# Patient Record
Sex: MALE | Race: WHITE
[De-identification: names, ages, dates, MRNs, and addresses within clinical notes are randomized per-mention and may not be internally consistent; named-entity substitution may affect disease eponyms.]

---

## 2021-01-02 ENCOUNTER — HOSPITAL ENCOUNTER (EMERGENCY)
Dept: HOSPITAL 95 - ER | Age: 66
Discharge: HOME | End: 2021-01-02
Payer: MEDICARE

## 2021-01-02 VITALS — BODY MASS INDEX: 28.44 KG/M2 | WEIGHT: 209.99 LBS | HEIGHT: 72 IN

## 2021-01-02 DIAGNOSIS — R40.4: Primary | ICD-10-CM

## 2021-01-02 LAB
ALBUMIN SERPL BCP-MCNC: 3.4 G/DL (ref 3.4–5)
ALBUMIN/GLOB SERPL: 1.1 {RATIO} (ref 0.8–1.8)
ALT SERPL W P-5'-P-CCNC: 30 U/L (ref 12–78)
ANION GAP SERPL CALCULATED.4IONS-SCNC: 8 MMOL/L (ref 6–16)
AST SERPL W P-5'-P-CCNC: 15 U/L (ref 12–37)
BASOPHILS # BLD AUTO: 0.02 K/MM3 (ref 0–0.23)
BASOPHILS NFR BLD AUTO: 0 % (ref 0–2)
BILIRUB SERPL-MCNC: 0.5 MG/DL (ref 0.1–1)
BUN SERPL-MCNC: 20 MG/DL (ref 8–24)
CALCIUM SERPL-MCNC: 8.6 MG/DL (ref 8.5–10.1)
CHLORIDE SERPL-SCNC: 110 MMOL/L (ref 98–108)
CK SERPL-CCNC: 105 U/L (ref 39–308)
CO2 SERPL-SCNC: 25 MMOL/L (ref 21–32)
CREAT SERPL-MCNC: 0.99 MG/DL (ref 0.6–1.2)
DEPRECATED RDW RBC AUTO: 41.8 FL (ref 35.1–46.3)
EOSINOPHIL # BLD AUTO: 0.11 K/MM3 (ref 0–0.68)
EOSINOPHIL NFR BLD AUTO: 2 % (ref 0–6)
ERYTHROCYTE [DISTWIDTH] IN BLOOD BY AUTOMATED COUNT: 12.6 % (ref 11.7–14.2)
GLOBULIN SER CALC-MCNC: 3.1 G/DL (ref 2.2–4)
GLUCOSE SERPL-MCNC: 114 MG/DL (ref 70–99)
HCT VFR BLD AUTO: 47.7 % (ref 37–53)
HGB BLD-MCNC: 15.7 G/DL (ref 13.5–17.5)
IMM GRANULOCYTES # BLD AUTO: 0.02 K/MM3 (ref 0–0.1)
IMM GRANULOCYTES NFR BLD AUTO: 0 % (ref 0–1)
LYMPHOCYTES # BLD AUTO: 1.1 K/MM3 (ref 0.84–5.2)
LYMPHOCYTES NFR BLD AUTO: 22 % (ref 21–46)
MCHC RBC AUTO-ENTMCNC: 32.9 G/DL (ref 31.5–36.5)
MCV RBC AUTO: 90 FL (ref 80–100)
MONOCYTES # BLD AUTO: 0.48 K/MM3 (ref 0.16–1.47)
MONOCYTES NFR BLD AUTO: 10 % (ref 4–13)
NEUTROPHILS # BLD AUTO: 3.32 K/MM3 (ref 1.96–9.15)
NEUTROPHILS NFR BLD AUTO: 66 % (ref 41–73)
NRBC # BLD AUTO: 0 K/MM3 (ref 0–0.02)
NRBC BLD AUTO-RTO: 0 /100 WBC (ref 0–0.2)
PLATELET # BLD AUTO: 188 K/MM3 (ref 150–400)
POTASSIUM SERPL-SCNC: 4.2 MMOL/L (ref 3.5–5.5)
PROT SERPL-MCNC: 6.5 G/DL (ref 6.4–8.2)
PROTHROMBIN TIME: 10.7 SEC (ref 9.7–11.5)
SODIUM SERPL-SCNC: 143 MMOL/L (ref 136–145)

## 2021-01-10 ENCOUNTER — HOSPITAL ENCOUNTER (INPATIENT)
Dept: HOSPITAL 95 - ER | Age: 66
LOS: 2 days | Discharge: HOME | DRG: 101 | End: 2021-01-12
Attending: HOSPITALIST | Admitting: HOSPITALIST
Payer: MEDICARE

## 2021-01-10 VITALS — BODY MASS INDEX: 29.16 KG/M2 | WEIGHT: 220 LBS | HEIGHT: 72.99 IN

## 2021-01-10 DIAGNOSIS — G93.40: ICD-10-CM

## 2021-01-10 DIAGNOSIS — G40.209: Primary | ICD-10-CM

## 2021-01-10 LAB
ALBUMIN SERPL BCP-MCNC: 3.5 G/DL (ref 3.4–5)
ALBUMIN SERPL BCP-MCNC: 3.9 G/DL (ref 3.4–5)
ALBUMIN/GLOB SERPL: 1.1 {RATIO} (ref 0.8–1.8)
ALBUMIN/GLOB SERPL: 1.1 {RATIO} (ref 0.8–1.8)
ALT SERPL W P-5'-P-CCNC: 27 U/L (ref 12–78)
ALT SERPL W P-5'-P-CCNC: 32 U/L (ref 12–78)
ANION GAP SERPL CALCULATED.4IONS-SCNC: 7 MMOL/L (ref 6–16)
ANION GAP SERPL CALCULATED.4IONS-SCNC: 9 MMOL/L (ref 6–16)
AST SERPL W P-5'-P-CCNC: 15 U/L (ref 12–37)
AST SERPL W P-5'-P-CCNC: 18 U/L (ref 12–37)
BASOPHILS # BLD AUTO: 0.03 K/MM3 (ref 0–0.23)
BASOPHILS # BLD AUTO: 0.05 K/MM3 (ref 0–0.23)
BASOPHILS NFR BLD AUTO: 0 % (ref 0–2)
BASOPHILS NFR BLD AUTO: 1 % (ref 0–2)
BILIRUB SERPL-MCNC: 0.3 MG/DL (ref 0.1–1)
BILIRUB SERPL-MCNC: 0.4 MG/DL (ref 0.1–1)
BUN SERPL-MCNC: 21 MG/DL (ref 8–24)
BUN SERPL-MCNC: 22 MG/DL (ref 8–24)
CALCIUM SERPL-MCNC: 8.9 MG/DL (ref 8.5–10.1)
CALCIUM SERPL-MCNC: 9 MG/DL (ref 8.5–10.1)
CHLORIDE SERPL-SCNC: 107 MMOL/L (ref 98–108)
CHLORIDE SERPL-SCNC: 108 MMOL/L (ref 98–108)
CK SERPL-CCNC: 112 U/L (ref 39–308)
CO2 SERPL-SCNC: 26 MMOL/L (ref 21–32)
CO2 SERPL-SCNC: 26 MMOL/L (ref 21–32)
CREAT SERPL-MCNC: 0.88 MG/DL (ref 0.6–1.2)
CREAT SERPL-MCNC: 1.07 MG/DL (ref 0.6–1.2)
DEPRECATED RDW RBC AUTO: 41.5 FL (ref 35.1–46.3)
DEPRECATED RDW RBC AUTO: 41.7 FL (ref 35.1–46.3)
EOSINOPHIL # BLD AUTO: 0.04 K/MM3 (ref 0–0.68)
EOSINOPHIL # BLD AUTO: 0.11 K/MM3 (ref 0–0.68)
EOSINOPHIL NFR BLD AUTO: 1 % (ref 0–6)
EOSINOPHIL NFR BLD AUTO: 2 % (ref 0–6)
ERYTHROCYTE [DISTWIDTH] IN BLOOD BY AUTOMATED COUNT: 12.6 % (ref 11.7–14.2)
ERYTHROCYTE [DISTWIDTH] IN BLOOD BY AUTOMATED COUNT: 12.6 % (ref 11.7–14.2)
ETHANOL SERPL-MCNC: <3 MG/DL
GLOBULIN SER CALC-MCNC: 3.1 G/DL (ref 2.2–4)
GLOBULIN SER CALC-MCNC: 3.4 G/DL (ref 2.2–4)
GLUCOSE SERPL-MCNC: 107 MG/DL (ref 70–99)
GLUCOSE SERPL-MCNC: 141 MG/DL (ref 70–99)
HCT VFR BLD AUTO: 45.1 % (ref 37–53)
HCT VFR BLD AUTO: 47.8 % (ref 37–53)
HGB BLD-MCNC: 14.9 G/DL (ref 13.5–17.5)
HGB BLD-MCNC: 15.6 G/DL (ref 13.5–17.5)
IMM GRANULOCYTES # BLD AUTO: 0.02 K/MM3 (ref 0–0.1)
IMM GRANULOCYTES # BLD AUTO: 0.02 K/MM3 (ref 0–0.1)
IMM GRANULOCYTES NFR BLD AUTO: 0 % (ref 0–1)
IMM GRANULOCYTES NFR BLD AUTO: 0 % (ref 0–1)
LYMPHOCYTES # BLD AUTO: 1.04 K/MM3 (ref 0.84–5.2)
LYMPHOCYTES # BLD AUTO: 2.02 K/MM3 (ref 0.84–5.2)
LYMPHOCYTES NFR BLD AUTO: 13 % (ref 21–46)
LYMPHOCYTES NFR BLD AUTO: 29 % (ref 21–46)
MCHC RBC AUTO-ENTMCNC: 32.6 G/DL (ref 31.5–36.5)
MCHC RBC AUTO-ENTMCNC: 33 G/DL (ref 31.5–36.5)
MCV RBC AUTO: 90 FL (ref 80–100)
MCV RBC AUTO: 91 FL (ref 80–100)
MONOCYTES # BLD AUTO: 0.68 K/MM3 (ref 0.16–1.47)
MONOCYTES # BLD AUTO: 0.77 K/MM3 (ref 0.16–1.47)
MONOCYTES NFR BLD AUTO: 10 % (ref 4–13)
MONOCYTES NFR BLD AUTO: 10 % (ref 4–13)
NEUTROPHILS # BLD AUTO: 4.18 K/MM3 (ref 1.96–9.15)
NEUTROPHILS # BLD AUTO: 6 K/MM3 (ref 1.96–9.15)
NEUTROPHILS NFR BLD AUTO: 59 % (ref 41–73)
NEUTROPHILS NFR BLD AUTO: 76 % (ref 41–73)
NRBC # BLD AUTO: 0 K/MM3 (ref 0–0.02)
NRBC # BLD AUTO: 0 K/MM3 (ref 0–0.02)
NRBC BLD AUTO-RTO: 0 /100 WBC (ref 0–0.2)
NRBC BLD AUTO-RTO: 0 /100 WBC (ref 0–0.2)
PLATELET # BLD AUTO: 214 K/MM3 (ref 150–400)
PLATELET # BLD AUTO: 228 K/MM3 (ref 150–400)
POTASSIUM SERPL-SCNC: 3.9 MMOL/L (ref 3.5–5.5)
POTASSIUM SERPL-SCNC: 4.1 MMOL/L (ref 3.5–5.5)
PROT SERPL-MCNC: 6.6 G/DL (ref 6.4–8.2)
PROT SERPL-MCNC: 7.3 G/DL (ref 6.4–8.2)
PROT UR STRIP-MCNC: (no result) MG/DL
PROTHROMBIN TIME: 10.7 SEC (ref 9.7–11.5)
RBC #/AREA URNS HPF: (no result) /HPF (ref 0–2)
SODIUM SERPL-SCNC: 141 MMOL/L (ref 136–145)
SODIUM SERPL-SCNC: 142 MMOL/L (ref 136–145)
SP GR SPEC: 1 (ref 1–1.02)
UROBILINOGEN UR STRIP-MCNC: (no result) MG/DL
WBC #/AREA URNS HPF: (no result) /HPF (ref 0–5)

## 2021-01-10 PROCEDURE — A9270 NON-COVERED ITEM OR SERVICE: HCPCS

## 2021-01-10 PROCEDURE — G0378 HOSPITAL OBSERVATION PER HR: HCPCS

## 2021-01-10 PROCEDURE — G0480 DRUG TEST DEF 1-7 CLASSES: HCPCS

## 2021-01-10 NOTE — NUR
SHIFT SUMMARY
RECIEVED REPORT FROM EUNICE FRANCO, ED @ 3946. ARRIVED TO MEDICAL UNIT VIA
STRETCHER @ 5430. NO NEED FOR TRANSFER ASSISTANCE. A/O, ABLE TO MAKE NEEDS
KNOWN. COOPERATIVE WITH CARE. ANSWERS QUESTIONS APPROPRIATELY. STATES STILL
UNABLE TO RECALL HOW HE GOT TO THE HOSPITAL AND DOES NOT KNOW WHICH
MEDICATIONS HE TAKES AT HOME. RECIEVED IV FLUIDS WITHOUT COMPLICATIONS. STEADY
TRANSFER TO BATHROOM. TELE RUNNING NSR IN 80's. VSS/AFEBRILE. AWAITING MRI. NO
ACUTE NEEDS @ THIS TIME. BED REMAINS IN LOWEST POSITION. CALL LIGHT WITHIN
REACH. REPORT GIVEN TO ONCSORIN FRANCO.

## 2021-01-10 NOTE — NUR
SHIFT SUMMARY-
PT ALERT AND ORIENTED. SPOUSE CAME IN TODAY AND BROUGHT THE PT HOME MEDICATION
BOTTLES IN. UPDATED PT MED REC IN THE COMPUTER AND NOTIFIED THE DR. PT HAD A
C/O PAIN IN HIS GROIN THIS AFTERNOON, STATED IT IS ONLY WHEN HE IS TRYING TO
GET UP OUT OF BED. MEDICATED WITH ALEVE PT STATED IT IS TOLLERABLE NOW. PT
CURRENTLY IN THE BED SLEEPING WITH THE CALL LIGHT IN REACH, IV IS SL AT THIS
TIME. NO S&S OF DISTRESS. PLAN IS FOR PT TO HAVE EEG TOMORROW. PT SPOUSE IS
AWARE OF THE PLAN AT THIS TIME NUMBER IS ON THE BOARD IN THE ROOM.
 
SIDE NOTE- THE PT SLEEPS EXTREMELY HARD, STAFF CAN CALL HIS NAME LOUDLY AND
RUB HIM AND PT WILL NOT WAKE. STAFF HAVE HAD TO SHAKE HIM AWAKE T/O THE SHIFT.
ONCE HE IS AWAKE THE PT IS AWAKE AND RESPONSIVE HE IS JUST A HEAVY SLEEPER PER
HIS WIFE AS WELL.

## 2021-01-11 LAB
ANION GAP SERPL CALCULATED.4IONS-SCNC: 5 MMOL/L (ref 6–16)
BUN SERPL-MCNC: 18 MG/DL (ref 8–24)
CALCIUM SERPL-MCNC: 8.6 MG/DL (ref 8.5–10.1)
CHLORIDE SERPL-SCNC: 110 MMOL/L (ref 98–108)
CO2 SERPL-SCNC: 30 MMOL/L (ref 21–32)
CREAT SERPL-MCNC: 0.88 MG/DL (ref 0.6–1.2)
DEPRECATED RDW RBC AUTO: 42 FL (ref 35.1–46.3)
ERYTHROCYTE [DISTWIDTH] IN BLOOD BY AUTOMATED COUNT: 12.6 % (ref 11.7–14.2)
GLUCOSE SERPL-MCNC: 96 MG/DL (ref 70–99)
HCT VFR BLD AUTO: 43.7 % (ref 37–53)
HGB BLD-MCNC: 14.4 G/DL (ref 13.5–17.5)
MCHC RBC AUTO-ENTMCNC: 33 G/DL (ref 31.5–36.5)
MCV RBC AUTO: 91 FL (ref 80–100)
NRBC # BLD AUTO: 0 K/MM3 (ref 0–0.02)
NRBC BLD AUTO-RTO: 0 /100 WBC (ref 0–0.2)
PLATELET # BLD AUTO: 198 K/MM3 (ref 150–400)
POTASSIUM SERPL-SCNC: 4 MMOL/L (ref 3.5–5.5)
SODIUM SERPL-SCNC: 145 MMOL/L (ref 136–145)

## 2021-01-11 NOTE — NUR
Shift Summary
A/Ox4, pleasant and cooperative. Up independently in room, eager to go home.
EEG completed this shift, results pending. Wife at bedside for majority of the
day. Had a shower. Appetite is good. No seizure-like activities noted, no
acute changes since start of shift. Denies pain, nausea, vomiting, diarrhea,
shortness of breath. Will report to oncoming RN.

## 2021-01-11 NOTE — NUR
Upon receiving an admit referral for advance directive (AD) education, I visit
patient. Patient's spouse, Nathen is bedside. We discuss patient's medical issues
and the plan of care going forward. 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
career in the lumber industry. Nathen tells me that she has filled out an AD on
herself understands about it and just wanted a form for the patient. I leave a
form with them answer a few questions and then leave them to continue their
visit.

## 2021-01-11 NOTE — NUR
NIGHT SHIFT SUMMARY
NO NEW S/S THIS SHIFT. PT HAS SLEPT FOR MOST OF THE SHIFT, PT IS A VERY HEAVY
SLEEPER. PT IS INDEPENDENT IN THE ROOM BUT HAS REMAINED IN BED MOST OF THE
SHIFT. PT DENIED ANY PAIN OR NAUSEA THIS SHIFT. WCTM.

## 2021-01-12 NOTE — NUR
Discharge Summary
A/Ox4, discharged to home. Reviewed discharge paperwork with patient and wife
at bedside. No questions at this time. Meds faxed to preferred pharmacy. Face
sheet, H&P, Progress Notes, and EEG results faxed to [facility] for
Dr. Carcamo to review. Office will contact PCP and/or patient to either
schedule appointment or for referral to another neurologist pending note
review. Escorted by CNA via w/c. Personal belongings sent home.

## 2021-11-27 ENCOUNTER — HOSPITAL ENCOUNTER (EMERGENCY)
Dept: HOSPITAL 95 - ER | Age: 66
LOS: 1 days | Discharge: HOME | End: 2021-11-28
Payer: MEDICARE

## 2021-11-27 VITALS — BODY MASS INDEX: 27.83 KG/M2 | WEIGHT: 209.99 LBS | HEIGHT: 73 IN

## 2021-11-27 DIAGNOSIS — G40.909: Primary | ICD-10-CM

## 2021-11-27 LAB
CA-I BLD-SCNC: 1.28 MMOL/L (ref 1.1–1.46)
CHLORIDE BLD-SCNC: 105 MMOL/L (ref 98–108)
CO2 BLD-SCNC: 27 MMOL/L (ref 21–32)
CREAT BLD-MCNC: 1.3 MG/DL (ref 0.8–1.3)
GLUCOSE BLDC GLUCOMTR-MCNC: 129 MG/DL (ref 70–99)
HCT VFR BLD CALC: 41 % (ref 41–53)
HGB BLD CALC-MCNC: 13.9 G/DL (ref 13.5–17.5)
POTASSIUM BLD-SCNC: 4 MMOL/L (ref 3.5–5.5)
SODIUM BLD-SCNC: 141 MMOL/L (ref 135–148)

## 2021-11-27 PROCEDURE — A9270 NON-COVERED ITEM OR SERVICE: HCPCS

## 2023-03-12 ENCOUNTER — HOSPITAL ENCOUNTER (OUTPATIENT)
Dept: HOSPITAL 95 - ER | Age: 68
Setting detail: OBSERVATION
LOS: 2 days | Discharge: HOME | End: 2023-03-14
Attending: HOSPITALIST | Admitting: HOSPITALIST
Payer: MEDICARE

## 2023-03-12 VITALS — HEIGHT: 73 IN | BODY MASS INDEX: 29.16 KG/M2 | WEIGHT: 220 LBS

## 2023-03-12 DIAGNOSIS — I10: ICD-10-CM

## 2023-03-12 DIAGNOSIS — G40.909: Primary | ICD-10-CM

## 2023-03-12 LAB
ALBUMIN SERPL BCP-MCNC: 3.9 G/DL (ref 3.4–5)
ALBUMIN/GLOB SERPL: 1.2 {RATIO} (ref 0.8–1.8)
ALT SERPL W P-5'-P-CCNC: 34 U/L (ref 12–78)
ANION GAP SERPL CALCULATED.4IONS-SCNC: 3 MMOL/L (ref 6–16)
AST SERPL W P-5'-P-CCNC: 39 U/L (ref 12–37)
BASOPHILS # BLD AUTO: 0.02 K/MM3 (ref 0–0.23)
BASOPHILS NFR BLD AUTO: 0 % (ref 0–2)
BILIRUB SERPL-MCNC: 0.5 MG/DL (ref 0.1–1)
BUN SERPL-MCNC: 25 MG/DL (ref 8–24)
CALCIUM SERPL-MCNC: 9.7 MG/DL (ref 8.5–10.1)
CHLORIDE SERPL-SCNC: 104 MMOL/L (ref 98–108)
CO2 SERPL-SCNC: 29 MMOL/L (ref 21–32)
CREAT SERPL-MCNC: 1.27 MG/DL (ref 0.6–1.2)
DEPRECATED RDW RBC AUTO: 40.9 FL (ref 35.1–46.3)
EOSINOPHIL # BLD AUTO: 0.01 K/MM3 (ref 0–0.68)
EOSINOPHIL NFR BLD AUTO: 0 % (ref 0–6)
ERYTHROCYTE [DISTWIDTH] IN BLOOD BY AUTOMATED COUNT: 12.6 % (ref 11.7–14.2)
GLOBULIN SER CALC-MCNC: 3.3 G/DL (ref 2.2–4)
GLUCOSE SERPL-MCNC: 135 MG/DL (ref 70–99)
HCT VFR BLD AUTO: 44.1 % (ref 37–53)
HGB BLD-MCNC: 15.2 G/DL (ref 13.5–17.5)
IMM GRANULOCYTES # BLD AUTO: 0.04 K/MM3 (ref 0–0.1)
IMM GRANULOCYTES NFR BLD AUTO: 0 % (ref 0–1)
LYMPHOCYTES # BLD AUTO: 0.64 K/MM3 (ref 0.84–5.2)
LYMPHOCYTES NFR BLD AUTO: 6 % (ref 21–46)
MAGNESIUM SERPL-MCNC: 2.4 MG/DL (ref 1.6–2.4)
MCHC RBC AUTO-ENTMCNC: 34.5 G/DL (ref 31.5–36.5)
MCV RBC AUTO: 90 FL (ref 80–100)
MONOCYTES # BLD AUTO: 0.94 K/MM3 (ref 0.16–1.47)
MONOCYTES NFR BLD AUTO: 9 % (ref 4–13)
NEUTROPHILS # BLD AUTO: 8.83 K/MM3 (ref 1.96–9.15)
NEUTROPHILS NFR BLD AUTO: 84 % (ref 41–73)
NRBC # BLD AUTO: 0 K/MM3 (ref 0–0.02)
NRBC BLD AUTO-RTO: 0 /100 WBC (ref 0–0.2)
PLATELET # BLD AUTO: 228 K/MM3 (ref 150–400)
POTASSIUM SERPL-SCNC: 3.9 MMOL/L (ref 3.5–5.5)
PROT SERPL-MCNC: 7.2 G/DL (ref 6.4–8.2)
SODIUM SERPL-SCNC: 136 MMOL/L (ref 136–145)

## 2023-03-12 PROCEDURE — G0378 HOSPITAL OBSERVATION PER HR: HCPCS

## 2023-03-12 PROCEDURE — A9270 NON-COVERED ITEM OR SERVICE: HCPCS

## 2023-03-13 LAB
ALBUMIN SERPL BCP-MCNC: 3.6 G/DL (ref 3.4–5)
ALBUMIN/GLOB SERPL: 1.1 {RATIO} (ref 0.8–1.8)
ALT SERPL W P-5'-P-CCNC: 31 U/L (ref 12–78)
ANION GAP SERPL CALCULATED.4IONS-SCNC: 3 MMOL/L (ref 6–16)
AST SERPL W P-5'-P-CCNC: 34 U/L (ref 12–37)
BASOPHILS # BLD AUTO: 0.04 K/MM3 (ref 0–0.23)
BASOPHILS NFR BLD AUTO: 0 % (ref 0–2)
BILIRUB SERPL-MCNC: 0.6 MG/DL (ref 0.1–1)
BUN SERPL-MCNC: 21 MG/DL (ref 8–24)
CALCIUM SERPL-MCNC: 9.3 MG/DL (ref 8.5–10.1)
CHLORIDE SERPL-SCNC: 107 MMOL/L (ref 98–108)
CO2 SERPL-SCNC: 30 MMOL/L (ref 21–32)
CREAT SERPL-MCNC: 1.21 MG/DL (ref 0.6–1.2)
DEPRECATED RDW RBC AUTO: 42.6 FL (ref 35.1–46.3)
EOSINOPHIL # BLD AUTO: 0.02 K/MM3 (ref 0–0.68)
EOSINOPHIL NFR BLD AUTO: 0 % (ref 0–6)
ERYTHROCYTE [DISTWIDTH] IN BLOOD BY AUTOMATED COUNT: 12.6 % (ref 11.7–14.2)
GLOBULIN SER CALC-MCNC: 3.2 G/DL (ref 2.2–4)
GLUCOSE SERPL-MCNC: 104 MG/DL (ref 70–99)
HCT VFR BLD AUTO: 42.9 % (ref 37–53)
HGB BLD-MCNC: 14.2 G/DL (ref 13.5–17.5)
IMM GRANULOCYTES # BLD AUTO: 0.02 K/MM3 (ref 0–0.1)
IMM GRANULOCYTES NFR BLD AUTO: 0 % (ref 0–1)
KETONES UR STRIP-MCNC: (no result) MG/DL
LYMPHOCYTES # BLD AUTO: 1.08 K/MM3 (ref 0.84–5.2)
LYMPHOCYTES NFR BLD AUTO: 12 % (ref 21–46)
MCHC RBC AUTO-ENTMCNC: 33.1 G/DL (ref 31.5–36.5)
MCV RBC AUTO: 92 FL (ref 80–100)
MONOCYTES # BLD AUTO: 0.92 K/MM3 (ref 0.16–1.47)
MONOCYTES NFR BLD AUTO: 10 % (ref 4–13)
NEUTROPHILS # BLD AUTO: 6.9 K/MM3 (ref 1.96–9.15)
NEUTROPHILS NFR BLD AUTO: 77 % (ref 41–73)
NRBC # BLD AUTO: 0 K/MM3 (ref 0–0.02)
NRBC BLD AUTO-RTO: 0 /100 WBC (ref 0–0.2)
PLATELET # BLD AUTO: 225 K/MM3 (ref 150–400)
POTASSIUM SERPL-SCNC: 4.1 MMOL/L (ref 3.5–5.5)
PROT SERPL-MCNC: 6.8 G/DL (ref 6.4–8.2)
PROT UR STRIP-MCNC: (no result) MG/DL
SODIUM SERPL-SCNC: 140 MMOL/L (ref 136–145)
SP GR SPEC: 1.01 (ref 1–1.02)
UROBILINOGEN UR STRIP-MCNC: (no result) MG/DL
WBC #/AREA URNS HPF: (no result) /HPF (ref 0–5)

## 2023-03-13 NOTE — NUR
SHIFT SUMMARY
 
PATIENT IS ALERT AND ORIENTED. PATIENT HAS HAD NO ACUTE EVENTS THIS SHIFT.
VITAL SIGNS REVIEWED. PATIENT WAS NPO FOR THE MORNING UNTIL PROCEDURE WAS
CANCELLED. ORDERED CARDIAC LUNCH FOR PATIENT. PATIENT IS ON SEIZURE
PRECAUTIONS. NO SEIZURES NOTED THIS SHIFT. PATIENTS WIFE HAS BEEN WITH PATIENT
MOST OF SHIFT. NS INFUSING UNTIL BAG IS DONE. PATIENT HAS NOT COMPLAINED OF
PAIN, SOB, NAUSEA OR VOMITTING THIS SHIFT. BED IN LOCKED AND LOWEST POSITION.
CALL LIGHT IN PLACE. WILL MONITOR UNTIL SHIFT CHANGE.

## 2023-03-13 NOTE — NUR
T/F/SUMMARY: PT T/F VIA W/C TO ROOM 310 AT 0320. HE WAS ABLE TO T/F SELF
W/1-2PA TO BED AND WAS ORIENTED TO ROOM AND CALL SYSTEM. PT A/O TO SELF,
SURROUNDINGS AND IS AWARE HE'S IN HOSPITAL FOR SEIZURES. HE SAID HE HAD "MAYBE
2 SEIZURES AT HOME AND 1 AGAIN IN ER". PT INITIALLY SAID THE YEAR WAS 1993 BUT
CORRECTED HIMSELF TO 2023. HE APPEARS SLIGHTLY WEAK AND AGREES STRENGTH IS
LESS THAN BASELINE. MOVEMENTS ARE ALSO A BIT UNCOORDINATED. SEIZURE PREC'S IN
PLACE AND BED ALARM ON FOR POSSIBLE IMPULSIVITY/FALL RISK. HE'S NPO W/NS
INFUSING  ML/HR. HE ARRIVED TO UNIT INCONTINENT OF URINE, LIKELY R/T
STRESS/URGE INCONTINENCE POST SEIZURE IN ER. GLADIS CARE DONE W/PULLUP PROVIDED.
EEG PLANNED TODAY AND TELE COMMENCED: NSR W/1ST DEGREE BLOCK AT 60'S-70'S BPM.
HE'S WAKEFUL BUT DROWSY, LIKELY R/T ATIVAN RECIEVED IN ER. KEPPRA ALSO WAS
GIVEN AND NO S/S SEIZURE ACTIVITY OBSEREVED SINCE ARRIVAL TO FLOOR. HE DENIES
ALL COMPLAINTS. VSS/AFEBRILE. WCTM AND REPORT TO DAY RN.

## 2023-03-14 NOTE — NUR
PT AWAKE DURING SHIFT REPORT. PLEASANT AND CO-OP WITH CARE. UP WITH SBA TO
BTHRM AND THEN INDEPENDENT. DR LOMELI IN TO SEE PT THIS AM; DISCUSSED PLAN OF
CARE. D/C ORDERS PLACED. MEDS FAXED PER PT REQUEST. IV D/C'D WNL'S AFTER IV
KEPPRA INFUSED. D/C INSTRUCTIONS REVIEWED WITH PT; VERBALIZED UNDERSTANDING.
PT'S WIFE HERE TO PICK HIM UP. PT ABLE TO DRESS HIMSELF. ASSISTED OUT TO
WIFE'S CAR VIA W/C. WIFE TAKING BELONGINGS. SR @ 86 PER TELE MX PRIOR TO D/C.

## 2023-03-14 NOTE — NUR
NIGHT SHIFT SUMMARY
NO ACUTE CHANGES THROUGHOUT THE NIGHT. A&O4. PATIENT EFFECTIVELY
COMMUNICATES NEEDS. VSS. RR EVEN AND UNLABORED ON RA. NO SEIZURE ACTIVITY
THIS THIS SHIFT. BED LOW AND LOCKED. CALL LIGHT WITHIN REACH. THIS RN
WILL CONTINUE TO MONITOR.

## 2025-03-27 ENCOUNTER — HOSPITAL ENCOUNTER (INPATIENT)
Dept: HOSPITAL 95 - ER | Age: 70
LOS: 2 days | Discharge: HOME | DRG: 101 | End: 2025-03-29
Attending: HOSPITALIST | Admitting: STUDENT IN AN ORGANIZED HEALTH CARE EDUCATION/TRAINING PROGRAM
Payer: MEDICARE

## 2025-03-27 VITALS — BODY MASS INDEX: 28.17 KG/M2 | WEIGHT: 212.53 LBS | HEIGHT: 73 IN

## 2025-03-27 VITALS — DIASTOLIC BLOOD PRESSURE: 87 MMHG | SYSTOLIC BLOOD PRESSURE: 173 MMHG

## 2025-03-27 DIAGNOSIS — I16.0: ICD-10-CM

## 2025-03-27 DIAGNOSIS — G40.209: Primary | ICD-10-CM

## 2025-03-27 DIAGNOSIS — I10: ICD-10-CM

## 2025-03-27 DIAGNOSIS — Z91.148: ICD-10-CM

## 2025-03-27 LAB
ALBUMIN SERPL BCP-MCNC: 3.6 G/DL (ref 3.4–5)
ALBUMIN/GLOB SERPL: 1.1 {RATIO} (ref 0.8–1.8)
ALT SERPL W P-5'-P-CCNC: 27 U/L (ref 12–78)
ANION GAP SERPL CALCULATED.4IONS-SCNC: 11 MMOL/L (ref 3–11)
ANION GAP SERPL CALCULATED.4IONS-SCNC: 7 MMOL/L (ref 3–11)
AST SERPL W P-5'-P-CCNC: 22 U/L (ref 12–37)
BASOPHILS # BLD AUTO: 0.03 K/MM3 (ref 0–0.23)
BASOPHILS NFR BLD AUTO: 0 % (ref 0–2)
BILIRUB SERPL-MCNC: 0.6 MG/DL (ref 0.1–1)
BUN SERPL-MCNC: 18 MG/DL (ref 8–24)
BUN SERPL-MCNC: 20 MG/DL (ref 8–24)
CALCIUM SERPL-MCNC: 8.8 MG/DL (ref 8.5–10.1)
CALCIUM SERPL-MCNC: 9.1 MG/DL (ref 8.5–10.1)
CHLORIDE SERPL-SCNC: 107 MMOL/L (ref 98–108)
CHLORIDE SERPL-SCNC: 107 MMOL/L (ref 98–108)
CO2 SERPL-SCNC: 26 MMOL/L (ref 21–32)
CO2 SERPL-SCNC: 28 MMOL/L (ref 21–32)
CREAT SERPL-MCNC: 1.15 MG/DL (ref 0.6–1.2)
CREAT SERPL-MCNC: 1.19 MG/DL (ref 0.6–1.2)
DEPRECATED RDW RBC AUTO: 44.7 FL (ref 35.1–46.3)
EOSINOPHIL # BLD AUTO: 0.01 K/MM3 (ref 0–0.68)
EOSINOPHIL NFR BLD AUTO: 0 % (ref 0–6)
ERYTHROCYTE [DISTWIDTH] IN BLOOD BY AUTOMATED COUNT: 13.8 % (ref 11.7–14.2)
GLOBULIN SER CALC-MCNC: 3.4 G/DL (ref 2.2–4)
GLUCOSE SERPL-MCNC: 130 MG/DL (ref 70–99)
GLUCOSE SERPL-MCNC: 149 MG/DL (ref 70–99)
HCT VFR BLD AUTO: 44 % (ref 37–53)
HGB BLD-MCNC: 15.3 G/DL (ref 13.5–17.5)
IMM GRANULOCYTES # BLD AUTO: 0.04 K/MM3 (ref 0–0.1)
IMM GRANULOCYTES NFR BLD AUTO: 0 % (ref 0–1)
LYMPHOCYTES # BLD AUTO: 0.38 K/MM3 (ref 0.84–5.2)
LYMPHOCYTES NFR BLD AUTO: 4 % (ref 21–46)
MAGNESIUM SERPL-MCNC: 1.8 MG/DL (ref 1.6–2.4)
MCHC RBC AUTO-ENTMCNC: 34.8 G/DL (ref 31.5–36.5)
MCV RBC AUTO: 89 FL (ref 80–100)
MONOCYTES # BLD AUTO: 0.84 K/MM3 (ref 0.16–1.47)
MONOCYTES NFR BLD AUTO: 8 % (ref 4–13)
NEUTROPHILS # BLD AUTO: 9.23 K/MM3 (ref 1.96–9.15)
NEUTROPHILS NFR BLD AUTO: 88 % (ref 41–73)
NRBC # BLD AUTO: 0 K/MM3 (ref 0–0.02)
NRBC BLD AUTO-RTO: 0 /100 WBC (ref 0–0.2)
PH BLDV: 7.41 [PH] (ref 7.34–7.37)
PLATELET # BLD AUTO: 221 K/MM3 (ref 150–400)
POTASSIUM SERPL-SCNC: 4.2 MMOL/L (ref 3.5–5.5)
POTASSIUM SERPL-SCNC: 4.5 MMOL/L (ref 3.5–5.5)
PROT SERPL-MCNC: 7 G/DL (ref 6.4–8.2)
SODIUM SERPL-SCNC: 138 MMOL/L (ref 136–145)
SODIUM SERPL-SCNC: 139 MMOL/L (ref 136–145)

## 2025-03-27 PROCEDURE — A9270 NON-COVERED ITEM OR SERVICE: HCPCS

## 2025-03-27 PROCEDURE — G0378 HOSPITAL OBSERVATION PER HR: HCPCS

## 2025-03-27 NOTE — NUR
ADMISSION NOTE
PATIENT ARRIVED TO PCU 04 VIA STRETCHER AT 2120. PATIENT WAS A WAKE AND
MUMBLING, NOT FOLLOWING DIRECTIONS VERY WELL. SLIDE TRANSFER COMPLETED. UPON
INITIAL ASSESSMENT PATIENT WAS AGITATED AND PULLING AT HIS GOWN AND LINES, BUT
PROMPTLY FELL ASLEEP AFTER BEING COVERED WITH BLANKETS. PATIENT WAS ABLE TO
ANSWER ORIENTATION QUESTIONS APPROPRIATELY, BUT NOT AWAKE ENOUGH TO PROVIDE
HISTORY. PATIENT ARRIVED ON 3 LTIERS O2 VIA NC WITH SPO2 %, NASAL
TRUMPET IN PLACE IN THE LEFT NARE. OXYGEN TITRATED TO ROOM AIR. SPO2 AND ETCO2
WITHIN NORMAL LIMITS. WILL CONTINUE TO MONITOR.

## 2025-03-28 VITALS — SYSTOLIC BLOOD PRESSURE: 137 MMHG | DIASTOLIC BLOOD PRESSURE: 79 MMHG

## 2025-03-28 VITALS — DIASTOLIC BLOOD PRESSURE: 88 MMHG | SYSTOLIC BLOOD PRESSURE: 156 MMHG

## 2025-03-28 VITALS — DIASTOLIC BLOOD PRESSURE: 84 MMHG | SYSTOLIC BLOOD PRESSURE: 150 MMHG

## 2025-03-28 VITALS — DIASTOLIC BLOOD PRESSURE: 81 MMHG | SYSTOLIC BLOOD PRESSURE: 159 MMHG

## 2025-03-28 VITALS — DIASTOLIC BLOOD PRESSURE: 84 MMHG | SYSTOLIC BLOOD PRESSURE: 162 MMHG

## 2025-03-28 VITALS — DIASTOLIC BLOOD PRESSURE: 69 MMHG | SYSTOLIC BLOOD PRESSURE: 122 MMHG

## 2025-03-28 VITALS — DIASTOLIC BLOOD PRESSURE: 92 MMHG | SYSTOLIC BLOOD PRESSURE: 172 MMHG

## 2025-03-28 VITALS — DIASTOLIC BLOOD PRESSURE: 81 MMHG | SYSTOLIC BLOOD PRESSURE: 162 MMHG

## 2025-03-28 LAB
ALBUMIN SERPL BCP-MCNC: 3.2 G/DL (ref 3.4–5)
ALBUMIN/GLOB SERPL: 1.1 {RATIO} (ref 0.8–1.8)
ALT SERPL W P-5'-P-CCNC: 20 U/L (ref 12–78)
ANION GAP SERPL CALCULATED.4IONS-SCNC: 10 MMOL/L (ref 3–11)
AST SERPL W P-5'-P-CCNC: 21 U/L (ref 12–37)
BASOPHILS # BLD AUTO: 0.02 K/MM3 (ref 0–0.23)
BASOPHILS NFR BLD AUTO: 0 % (ref 0–2)
BILIRUB SERPL-MCNC: 0.9 MG/DL (ref 0.1–1)
BUN SERPL-MCNC: 15 MG/DL (ref 8–24)
CALCIUM SERPL-MCNC: 8.5 MG/DL (ref 8.5–10.1)
CHLORIDE SERPL-SCNC: 107 MMOL/L (ref 98–108)
CO2 SERPL-SCNC: 26 MMOL/L (ref 21–32)
CREAT SERPL-MCNC: 0.97 MG/DL (ref 0.6–1.2)
DEPRECATED RDW RBC AUTO: 44.3 FL (ref 35.1–46.3)
EOSINOPHIL # BLD AUTO: 0.02 K/MM3 (ref 0–0.68)
EOSINOPHIL NFR BLD AUTO: 0 % (ref 0–6)
ERYTHROCYTE [DISTWIDTH] IN BLOOD BY AUTOMATED COUNT: 13.7 % (ref 11.7–14.2)
GLOBULIN SER CALC-MCNC: 2.9 G/DL (ref 2.2–4)
GLUCOSE SERPL-MCNC: 107 MG/DL (ref 70–99)
HCT VFR BLD AUTO: 42 % (ref 37–53)
HGB BLD-MCNC: 14.3 G/DL (ref 13.5–17.5)
IMM GRANULOCYTES # BLD AUTO: 0.03 K/MM3 (ref 0–0.1)
IMM GRANULOCYTES NFR BLD AUTO: 0 % (ref 0–1)
LYMPHOCYTES # BLD AUTO: 0.7 K/MM3 (ref 0.84–5.2)
LYMPHOCYTES NFR BLD AUTO: 9 % (ref 21–46)
MAGNESIUM SERPL-MCNC: 1.7 MG/DL (ref 1.6–2.4)
MCHC RBC AUTO-ENTMCNC: 34 G/DL (ref 31.5–36.5)
MCV RBC AUTO: 88 FL (ref 80–100)
MONOCYTES # BLD AUTO: 0.98 K/MM3 (ref 0.16–1.47)
MONOCYTES NFR BLD AUTO: 12 % (ref 4–13)
NEUTROPHILS # BLD AUTO: 6.18 K/MM3 (ref 1.96–9.15)
NEUTROPHILS NFR BLD AUTO: 78 % (ref 41–73)
NRBC # BLD AUTO: 0 K/MM3 (ref 0–0.02)
NRBC BLD AUTO-RTO: 0 /100 WBC (ref 0–0.2)
PLATELET # BLD AUTO: 215 K/MM3 (ref 150–400)
POTASSIUM SERPL-SCNC: 3.7 MMOL/L (ref 3.5–5.5)
PROT SERPL-MCNC: 6.1 G/DL (ref 6.4–8.2)
SODIUM SERPL-SCNC: 139 MMOL/L (ref 136–145)

## 2025-03-28 NOTE — NUR
CALLED [facility]-
PT STATES THIS IS WHERE HE GETS HIS MEDS. PER THE PHARMACY IT HAS BEEN MORE
THAN 3 MONTHS SINCE HE HAS REFILLED HIS MEDS, SO PT WAS ON NO HOME
MEDICATIONS.

## 2025-03-28 NOTE — NUR
SHIFT SUMMARY
PATIENT CONTINUES TO BE DROWSY, BUT WOKE UP THIS MORNING ASKING TO GET OUT OF
BED AND WAS ABLE TO FOLLOW DIRECTIONS WELL. THIS RN AND PATIENT'S CNA WERE
ABLE TO GET HIM UP TO THE RECLINER WITH 1-2 PERSON ASSIST. PATIENT CONTINUES
ON ROOM AIR AND NASAL TRUMPET WAS REMOVED. PATIENT ANSWERS ALL ORIENTATION
QUESTIONS APPROPRIATELY. MEDICATED PER EMAR FOR HYPERTENTION. WILL CONTINUE TO
MONITOR. CALL LIGHT WITHIN REACH.

## 2025-03-28 NOTE — NUR
CALLED DR RICHI CARBAJAL MORE AWAKE AND TALKING TO STAFF, ASKING WHEN HE MIGHT GET TO GO HOME.
RECIEVED AN ORDER FOR PO KEPPRA, AND A DIET AS WELL AS PO LISINOPRIL, FIRST
DOSE NOW.

## 2025-03-28 NOTE — NUR
PT GAVE VERBAL CONSENT FOR THIS RN TO PROVIDE VERBAL UPDATES TO HIS SON ALINE
ABOUT HIS MEDIICAL CONDITION. SON WAS PROVIDED WITH AN UPDATE AND STATED HE
WOULD BE HERE BUT HE LIVES IN [place]. PT LIVES WITH HIS SPOUSE WHO IS ALSO
ADMITTED TO THE HOSPITAL.

## 2025-03-28 NOTE — NUR
SHIFT SUMMARY-
PT ALERT AND ORIENTED TO SELF, PLACE AND SITUATION. HE IS FORGETFUL. HE HAD A
VISITOR THIS AFTERNOON AND DOES NOT RECALL SEEING ANYONE. HE IS AWAKE AND
SEEMED INTERESTED IN EATING FOOD. IFV DC'D KEPPRA CHANGED TO PO. STARTED PO
LISINOPRIL TODAY. PT IS SITTING UP IN THE RECLINER, HE USES THE URINAL
INDEPENDENTLY THERE. HE HAS HIS CALL LIGHT IN REACH NO S&S OF DISTRESS NOTED
AT THIS TIME.

## 2025-03-29 VITALS — DIASTOLIC BLOOD PRESSURE: 66 MMHG | SYSTOLIC BLOOD PRESSURE: 128 MMHG

## 2025-03-29 VITALS — DIASTOLIC BLOOD PRESSURE: 75 MMHG | SYSTOLIC BLOOD PRESSURE: 150 MMHG

## 2025-03-29 VITALS — SYSTOLIC BLOOD PRESSURE: 145 MMHG | DIASTOLIC BLOOD PRESSURE: 78 MMHG

## 2025-03-29 VITALS — DIASTOLIC BLOOD PRESSURE: 75 MMHG | SYSTOLIC BLOOD PRESSURE: 131 MMHG

## 2025-03-29 NOTE — NUR
DISCHARGE SUMMARY
 
PT A&Ox4, CALLS AND COMMUNICATES NEEDS APPROPRIATELY. BP STABLE, SINUS 80's,
DENIES CP/PRESSURE. SpO2> 92% RA, DENEIS SOB. 1 ASSIST TO CHAIR. IN/CONTINENT
OF URINE. ALL PT BELONGINGS GATHERED. THIS RN EXPRESSED IMPORTANCE OF TAKEN
MEDICATIONS AS DIRECTED. CHARGE RN PROVIDED DISCHARGE EDUCATION WITH SISTER
IN-LAW AT BEDSIDE. PT DISCHARGED AND CLINICAL STAFF MEMBER TRANSPORTED PT VIA
WHEELCHAIR TO ROOM 334 WITH SISTER IN-LAW TO VISIT PTs WIFE AT APPROXIMATELY
1240.

## 2025-03-30 LAB — LEVETIRACETAM SERPL-MCNC: <2 UG/ML (ref 10–40)
